# Patient Record
(demographics unavailable — no encounter records)

---

## 2025-03-11 NOTE — DISCUSSION/SUMMARY
[FreeTextEntry1] : In summary   Pleasant, 49 year old with a past SIG FHX Of MULTIPLE Family history of Premature ASCVD, Personal Past Medical  history of Hyperlipidemia, ASCVD S/p First PCI 43 Years (Now Multiple mLAD/LCxs/RCA Stents), HTN  =============== =============== SIG FHX Of MULTIPLE Family history of Premature ASCVD, Personal Past Medical  history of Hyperlipidemia, ASCVD S/p First PCI 43 Years (Now Multiple mLAD/LCxs/RCA Stents) - Continue Crestor - Continue Wegovy - Continue Leqvio not mAb   Addendum: cannot tolerate PCSK9i due to flu like symptoms that developed 1-2 days after injection  Start Leqvio                     Cholesterol management - Assessed - Impression is active; changes as per above - Discussed diet and exercise at length - Any lifestyle/medication changes as per above Risk factors for cardiomyopathy - Assessed - Impression is stable - Reviewed records from PCP BP - Assessed - Impression is active Cardiac Health optimization - Discussed diet and exercise at length - Discussed importance of monitoring and re-assessment of cardiac health on further visits       Prieto, it was a pleasure to participate in the care of your patient.   With kind thanks for the referral.   Rashad Finch MD [facility] [place] Director, Preventive Cardiology & Lipidology [facility]                                                                                                                                                                                                                                                                                                                                                                                                                                                                                                                                                                                                                                                                                                                                            ----------- 23 minutes was provided for preventive counseling on healthy diet, weight maintenance, CV risk reduction. Specifically, and separate from other cardiovascular evaluation and treatment, we discussed h willingness to focus  on lifestyle changes, particularly dietary; including greater consumption of vegetables, fruits over saturated fats. We discussed appropriate follow up to monitor progress on these areas.     -                                    [EKG obtained to assist in diagnosis and management of assessed problem(s)] : EKG obtained to assist in diagnosis and management of assessed problem(s)

## 2025-03-11 NOTE — HISTORY OF PRESENT ILLNESS
[FreeTextEntry1] : Dear Prieto,   I had the pleasure of seeing your patient NELLY TRUJILLO for Cardiometabolic evaluation.   As you know, he  is a Pleasant, 49 year old with a past SIG FHX Of MULTIPLE Family history of Premature ASCVD, Personal Past Medical  history of Hyperlipidemia, ASCVD S/p First PCI 43 Years (Now Multiple mLAD/LCxs/RCA Stents), HTN  =============== =============== SIG FHX Of MULTIPLE Family history of Premature ASCVD, Personal Past Medical  history of Hyperlipidemia, ASCVD S/p First PCI 43 Years (Now Multiple mLAD/LCxs/RCA Stents) - Check Lpa/CRP  - Possible Gilliam A or Apheresis  - Continue Crestor. Failed Lipitor after 12 weeks of therapy. severe myalgias which subsided after discontinuation. Unable to tolerate zetia due to GI disturbances. * - Continue Wegovy - Been on PCSK9i  - Discussed family screening  - We discussed diet/exercise to be followed by nutritional counseling by an appointment to be made with our RD at the lipid center. ----------------- -----------------  Very Elevated Lpa - left message to call back Will discuss Lpa - trial and family screening  Continue PCSK9i ----------------------------  Starting Leqvio   Some fatigue; windedheadedness  ----------------- -----------------  LDLc 28 Leqvio today  ----------------- ----------------- 3-2025 CC: Heart Issues - Patient reports no chest pain that radiates, no localization to the sternum, no radiation to the neck/jaw, no alleviating nor worsening precipitants to CP, no assoc symptoms to CP - Patient notes no associated SOB/Palps/Leg swelling - Reports No associated F/C/N/V/Headaches - Reports Normal Exercise Tolerance - Reports no medication changes - Reports normal mood/quality of life - Reports no associated midnight awakenings from cP - Reports no diet changes - Reports no associated body aches - Reports no recent colds/viruses - Recent labs/imaging reviewed - Relevant Family history reviewed Mother/Father - CV Still with more stents Does not want to russell on a trial  Injection [id number] 3/11/2025 injection

## 2025-03-11 NOTE — HISTORY OF PRESENT ILLNESS
[FreeTextEntry1] : Dear Prieto,   I had the pleasure of seeing your patient NELLY TRUJILLO for Cardiometabolic evaluation.   As you know, he  is a Pleasant, 49 year old with a past SIG FHX Of MULTIPLE Family history of Premature ASCVD, Personal Past Medical  history of Hyperlipidemia, ASCVD S/p First PCI 43 Years (Now Multiple mLAD/LCxs/RCA Stents), HTN  =============== =============== SIG FHX Of MULTIPLE Family history of Premature ASCVD, Personal Past Medical  history of Hyperlipidemia, ASCVD S/p First PCI 43 Years (Now Multiple mLAD/LCxs/RCA Stents) - Check Lpa/CRP  - Possible [place]on A or Apheresis  - Continue Crestor. Failed Lipitor after 12 weeks of therapy. severe myalgias which subsided after discontinuation. Unable to tolerate zetia due to GI disturbances. * - Continue Wegovy - Been on PCSK9i  - Discussed family screening  - We discussed diet/exercise to be followed by nutritional counseling by an appointment to be made with our RD at the lipid center. ----------------- -----------------  Very Elevated Lpa - left message to call back Will discuss Lpa - trial and family screening  Continue PCSK9i ----------------------------  Starting Leqvio   Some fatigue; windedheadedness  ----------------- -----------------  LDLc 28 Leqvio today  ----------------- ----------------- 3-2025 CC: Heart Issues - Patient reports no chest pain that radiates, no localization to the sternum, no radiation to the neck/jaw, no alleviating nor worsening precipitants to CP, no assoc symptoms to CP - Patient notes no associated SOB/Palps/Leg swelling - Reports No associated F/C/N/V/Headaches - Reports Normal Exercise Tolerance - Reports no medication changes - Reports normal mood/quality of life - Reports no associated midnight awakenings from cP - Reports no diet changes - Reports no associated body aches - Reports no recent colds/viruses - Recent labs/imaging reviewed - Relevant Family history reviewed Mother/Father - CV Still with more stents Does not want to russell on a trial  Injection [id number] 3/11/2025 injection

## 2025-03-11 NOTE — DISCUSSION/SUMMARY
[FreeTextEntry1] : In summary   Pleasant, 49 year old with a past SIG FHX Of MULTIPLE Family history of Premature ASCVD, Personal Past Medical  history of Hyperlipidemia, ASCVD S/p First PCI 43 Years (Now Multiple mLAD/LCxs/RCA Stents), HTN  =============== =============== SIG FHX Of MULTIPLE Family history of Premature ASCVD, Personal Past Medical  history of Hyperlipidemia, ASCVD S/p First PCI 43 Years (Now Multiple mLAD/LCxs/RCA Stents) - Continue Crestor - Continue Wegovy - Continue Leqvio not mAb   Addendum: cannot tolerate PCSK9i due to flu like symptoms that developed 1-2 days after injection  Start Leqvio                     Cholesterol management - Assessed - Impression is active; changes as per above - Discussed diet and exercise at length - Any lifestyle/medication changes as per above Risk factors for cardiomyopathy - Assessed - Impression is stable - Reviewed records from PCP BP - Assessed - Impression is active Cardiac Health optimization - Discussed diet and exercise at length - Discussed importance of monitoring and re-assessment of cardiac health on further visits       Prieto, it was a pleasure to participate in the care of your patient.   With kind thanks for the referral.   Rashad Finch MD [facility] [place] Director, Preventive Cardiology & Lipidology [facility]                                                                                                                                                                                                                                                                                                                                                                                                                                                                                                                                                                                                                                                                                                                                            ----------- 23 minutes was provided for preventive counseling on healthy diet, weight maintenance, CV risk reduction. Specifically, and separate from other cardiovascular evaluation and treatment, we discussed h willingness to focus  on lifestyle changes, particularly dietary; including greater consumption of vegetables, fruits over saturated fats. We discussed appropriate follow up to monitor progress on these areas.     -                                    [EKG obtained to assist in diagnosis and management of assessed problem(s)] : EKG obtained to assist in diagnosis and management of assessed problem(s)